# Patient Record
Sex: FEMALE | Race: OTHER | Employment: FULL TIME | URBAN - METROPOLITAN AREA
[De-identification: names, ages, dates, MRNs, and addresses within clinical notes are randomized per-mention and may not be internally consistent; named-entity substitution may affect disease eponyms.]

---

## 2018-01-18 ENCOUNTER — ANESTHESIA EVENT (OUTPATIENT)
Dept: SURGERY | Age: 38
End: 2018-01-18
Payer: COMMERCIAL

## 2018-01-18 RX ORDER — FENTANYL CITRATE 50 UG/ML
100 INJECTION, SOLUTION INTRAMUSCULAR; INTRAVENOUS ONCE
Status: CANCELLED | OUTPATIENT
Start: 2018-01-18 | End: 2018-01-18

## 2018-01-22 ENCOUNTER — ANESTHESIA (OUTPATIENT)
Dept: SURGERY | Age: 38
End: 2018-01-22
Payer: COMMERCIAL

## 2018-01-22 ENCOUNTER — HOSPITAL ENCOUNTER (OUTPATIENT)
Age: 38
Setting detail: OUTPATIENT SURGERY
Discharge: HOME | End: 2018-01-22
Attending: SURGERY | Admitting: SURGERY
Payer: COMMERCIAL

## 2018-01-22 VITALS
BODY MASS INDEX: 29.29 KG/M2 | SYSTOLIC BLOOD PRESSURE: 119 MMHG | DIASTOLIC BLOOD PRESSURE: 66 MMHG | TEMPERATURE: 97.8 F | OXYGEN SATURATION: 97 % | HEART RATE: 57 BPM | WEIGHT: 187 LBS | RESPIRATION RATE: 16 BRPM

## 2018-01-22 DIAGNOSIS — K64.8 HEMORRHOIDS, INTERNAL, WITH BLEEDING: Primary | ICD-10-CM

## 2018-01-22 LAB — HCG UR QL: NEGATIVE

## 2018-01-22 PROCEDURE — 77030018836 HC SOL IRR NACL ICUM -A: Performed by: SURGERY

## 2018-01-22 PROCEDURE — 74011250636 HC RX REV CODE- 250/636

## 2018-01-22 PROCEDURE — 77030011640 HC PAD GRND REM COVD -A: Performed by: SURGERY

## 2018-01-22 PROCEDURE — 77030032490 HC SLV COMPR SCD KNE COVD -B: Performed by: SURGERY

## 2018-01-22 PROCEDURE — 74011000250 HC RX REV CODE- 250: Performed by: SURGERY

## 2018-01-22 PROCEDURE — 74011000258 HC RX REV CODE- 258: Performed by: SURGERY

## 2018-01-22 PROCEDURE — 76010000138 HC OR TIME 0.5 TO 1 HR: Performed by: SURGERY

## 2018-01-22 PROCEDURE — 77030020143 HC AIRWY LARYN INTUB CGAS -A: Performed by: ANESTHESIOLOGY

## 2018-01-22 PROCEDURE — 76210000016 HC OR PH I REC 1 TO 1.5 HR: Performed by: SURGERY

## 2018-01-22 PROCEDURE — 74011250636 HC RX REV CODE- 250/636: Performed by: SURGERY

## 2018-01-22 PROCEDURE — 74011250636 HC RX REV CODE- 250/636: Performed by: ANESTHESIOLOGY

## 2018-01-22 PROCEDURE — 76060000033 HC ANESTHESIA 1 TO 1.5 HR: Performed by: SURGERY

## 2018-01-22 PROCEDURE — 76210000020 HC REC RM PH II FIRST 0.5 HR: Performed by: SURGERY

## 2018-01-22 PROCEDURE — 88304 TISSUE EXAM BY PATHOLOGIST: CPT | Performed by: SURGERY

## 2018-01-22 PROCEDURE — 77030034154 HC SHR COAG HARM ACE J&J -F: Performed by: SURGERY

## 2018-01-22 PROCEDURE — 74011250637 HC RX REV CODE- 250/637: Performed by: ANESTHESIOLOGY

## 2018-01-22 PROCEDURE — 74011000250 HC RX REV CODE- 250

## 2018-01-22 PROCEDURE — 81025 URINE PREGNANCY TEST: CPT

## 2018-01-22 RX ORDER — OXYCODONE AND ACETAMINOPHEN 5; 325 MG/1; MG/1
2 TABLET ORAL
Qty: 40 TAB | Refills: 0 | Status: SHIPPED | OUTPATIENT
Start: 2018-01-22 | End: 2018-01-26 | Stop reason: SDUPTHER

## 2018-01-22 RX ORDER — CEFOXITIN 2 G/1
2 INJECTION, POWDER, FOR SOLUTION INTRAVENOUS EVERY 6 HOURS
Status: DISCONTINUED | OUTPATIENT
Start: 2018-01-22 | End: 2018-01-22

## 2018-01-22 RX ORDER — PROPOFOL 10 MG/ML
INJECTION, EMULSION INTRAVENOUS
Status: DISCONTINUED | OUTPATIENT
Start: 2018-01-22 | End: 2018-01-22 | Stop reason: HOSPADM

## 2018-01-22 RX ORDER — LIDOCAINE HYDROCHLORIDE 10 MG/ML
0.1 INJECTION INFILTRATION; PERINEURAL AS NEEDED
Status: DISCONTINUED | OUTPATIENT
Start: 2018-01-22 | End: 2018-01-22 | Stop reason: HOSPADM

## 2018-01-22 RX ORDER — MIDAZOLAM HYDROCHLORIDE 1 MG/ML
2 INJECTION, SOLUTION INTRAMUSCULAR; INTRAVENOUS
Status: DISCONTINUED | OUTPATIENT
Start: 2018-01-22 | End: 2018-01-22 | Stop reason: HOSPADM

## 2018-01-22 RX ORDER — HYDROMORPHONE HYDROCHLORIDE 2 MG/ML
0.5 INJECTION, SOLUTION INTRAMUSCULAR; INTRAVENOUS; SUBCUTANEOUS
Status: DISCONTINUED | OUTPATIENT
Start: 2018-01-22 | End: 2018-01-22 | Stop reason: HOSPADM

## 2018-01-22 RX ORDER — CEFOXITIN 2 G/1
2 INJECTION, POWDER, FOR SOLUTION INTRAVENOUS ONCE
Status: DISCONTINUED | OUTPATIENT
Start: 2018-01-22 | End: 2018-01-22 | Stop reason: SDUPTHER

## 2018-01-22 RX ORDER — DIBUCAINE 1 %
OINTMENT (GRAM) TOPICAL 3 TIMES DAILY
Qty: 30 G | Refills: 3 | Status: SHIPPED | OUTPATIENT
Start: 2018-01-22 | End: 2018-03-05

## 2018-01-22 RX ORDER — HYDROCODONE BITARTRATE AND ACETAMINOPHEN 5; 325 MG/1; MG/1
2 TABLET ORAL AS NEEDED
Status: DISCONTINUED | OUTPATIENT
Start: 2018-01-22 | End: 2018-01-22 | Stop reason: HOSPADM

## 2018-01-22 RX ORDER — DEXAMETHASONE SODIUM PHOSPHATE 4 MG/ML
INJECTION, SOLUTION INTRA-ARTICULAR; INTRALESIONAL; INTRAMUSCULAR; INTRAVENOUS; SOFT TISSUE AS NEEDED
Status: DISCONTINUED | OUTPATIENT
Start: 2018-01-22 | End: 2018-01-22 | Stop reason: HOSPADM

## 2018-01-22 RX ORDER — SODIUM CHLORIDE, SODIUM LACTATE, POTASSIUM CHLORIDE, CALCIUM CHLORIDE 600; 310; 30; 20 MG/100ML; MG/100ML; MG/100ML; MG/100ML
75 INJECTION, SOLUTION INTRAVENOUS CONTINUOUS
Status: DISCONTINUED | OUTPATIENT
Start: 2018-01-22 | End: 2018-01-22 | Stop reason: HOSPADM

## 2018-01-22 RX ORDER — FAMOTIDINE 20 MG/1
20 TABLET, FILM COATED ORAL ONCE
Status: COMPLETED | OUTPATIENT
Start: 2018-01-22 | End: 2018-01-22

## 2018-01-22 RX ORDER — LIDOCAINE HYDROCHLORIDE 20 MG/ML
JELLY TOPICAL AS NEEDED
Status: DISCONTINUED | OUTPATIENT
Start: 2018-01-22 | End: 2018-01-22 | Stop reason: HOSPADM

## 2018-01-22 RX ORDER — MINERAL OIL
15 OIL (ML) ORAL DAILY
Qty: 1 BOTTLE | Refills: 3 | Status: SHIPPED | OUTPATIENT
Start: 2018-01-22 | End: 2018-03-05

## 2018-01-22 RX ORDER — BUPIVACAINE HYDROCHLORIDE 2.5 MG/ML
INJECTION, SOLUTION EPIDURAL; INFILTRATION; INTRACAUDAL AS NEEDED
Status: DISCONTINUED | OUTPATIENT
Start: 2018-01-22 | End: 2018-01-22 | Stop reason: HOSPADM

## 2018-01-22 RX ORDER — FENTANYL CITRATE 50 UG/ML
INJECTION, SOLUTION INTRAMUSCULAR; INTRAVENOUS AS NEEDED
Status: DISCONTINUED | OUTPATIENT
Start: 2018-01-22 | End: 2018-01-22 | Stop reason: HOSPADM

## 2018-01-22 RX ORDER — PROPOFOL 10 MG/ML
INJECTION, EMULSION INTRAVENOUS AS NEEDED
Status: DISCONTINUED | OUTPATIENT
Start: 2018-01-22 | End: 2018-01-22 | Stop reason: HOSPADM

## 2018-01-22 RX ORDER — OXYCODONE HYDROCHLORIDE 5 MG/1
5 TABLET ORAL
Status: DISCONTINUED | OUTPATIENT
Start: 2018-01-22 | End: 2018-01-22 | Stop reason: HOSPADM

## 2018-01-22 RX ORDER — LIDOCAINE HYDROCHLORIDE 20 MG/ML
INJECTION, SOLUTION EPIDURAL; INFILTRATION; INTRACAUDAL; PERINEURAL AS NEEDED
Status: DISCONTINUED | OUTPATIENT
Start: 2018-01-22 | End: 2018-01-22 | Stop reason: HOSPADM

## 2018-01-22 RX ORDER — ADHESIVE BANDAGE
30 BANDAGE TOPICAL DAILY
Qty: 1 BOTTLE | Refills: 3 | Status: SHIPPED | OUTPATIENT
Start: 2018-01-22 | End: 2018-03-05

## 2018-01-22 RX ORDER — MIDAZOLAM HYDROCHLORIDE 1 MG/ML
5 INJECTION, SOLUTION INTRAMUSCULAR; INTRAVENOUS ONCE
Status: DISCONTINUED | OUTPATIENT
Start: 2018-01-22 | End: 2018-01-22 | Stop reason: HOSPADM

## 2018-01-22 RX ORDER — SILVER SULFADIAZINE 10 G/1000G
CREAM TOPICAL AS NEEDED
Status: DISCONTINUED | OUTPATIENT
Start: 2018-01-22 | End: 2018-01-22 | Stop reason: HOSPADM

## 2018-01-22 RX ADMIN — LIDOCAINE HYDROCHLORIDE 100 MG: 20 INJECTION, SOLUTION EPIDURAL; INFILTRATION; INTRACAUDAL; PERINEURAL at 11:19

## 2018-01-22 RX ADMIN — FENTANYL CITRATE 25 MCG: 50 INJECTION, SOLUTION INTRAMUSCULAR; INTRAVENOUS at 11:32

## 2018-01-22 RX ADMIN — FENTANYL CITRATE 25 MCG: 50 INJECTION, SOLUTION INTRAMUSCULAR; INTRAVENOUS at 11:46

## 2018-01-22 RX ADMIN — DEXAMETHASONE SODIUM PHOSPHATE 10 MG: 4 INJECTION, SOLUTION INTRA-ARTICULAR; INTRALESIONAL; INTRAMUSCULAR; INTRAVENOUS; SOFT TISSUE at 11:22

## 2018-01-22 RX ADMIN — FENTANYL CITRATE 50 MCG: 50 INJECTION, SOLUTION INTRAMUSCULAR; INTRAVENOUS at 11:15

## 2018-01-22 RX ADMIN — HYDROMORPHONE HYDROCHLORIDE 0.5 MG: 2 INJECTION, SOLUTION INTRAMUSCULAR; INTRAVENOUS; SUBCUTANEOUS at 12:40

## 2018-01-22 RX ADMIN — SODIUM CHLORIDE, SODIUM LACTATE, POTASSIUM CHLORIDE, AND CALCIUM CHLORIDE: 600; 310; 30; 20 INJECTION, SOLUTION INTRAVENOUS at 12:00

## 2018-01-22 RX ADMIN — PROPOFOL 75 MCG/KG/MIN: 10 INJECTION, EMULSION INTRAVENOUS at 11:26

## 2018-01-22 RX ADMIN — FENTANYL CITRATE 25 MCG: 50 INJECTION, SOLUTION INTRAMUSCULAR; INTRAVENOUS at 11:51

## 2018-01-22 RX ADMIN — HYDROMORPHONE HYDROCHLORIDE 0.5 MG: 2 INJECTION, SOLUTION INTRAMUSCULAR; INTRAVENOUS; SUBCUTANEOUS at 12:47

## 2018-01-22 RX ADMIN — FENTANYL CITRATE 25 MCG: 50 INJECTION, SOLUTION INTRAMUSCULAR; INTRAVENOUS at 11:35

## 2018-01-22 RX ADMIN — FAMOTIDINE 20 MG: 20 TABLET, FILM COATED ORAL at 09:00

## 2018-01-22 RX ADMIN — FENTANYL CITRATE 50 MCG: 50 INJECTION, SOLUTION INTRAMUSCULAR; INTRAVENOUS at 11:55

## 2018-01-22 RX ADMIN — SODIUM CHLORIDE, SODIUM LACTATE, POTASSIUM CHLORIDE, AND CALCIUM CHLORIDE 75 ML/HR: 600; 310; 30; 20 INJECTION, SOLUTION INTRAVENOUS at 09:01

## 2018-01-22 RX ADMIN — CEFOXITIN 2 G: 2 INJECTION, POWDER, FOR SOLUTION INTRAVENOUS at 11:28

## 2018-01-22 RX ADMIN — MIDAZOLAM HYDROCHLORIDE 2 MG: 1 INJECTION, SOLUTION INTRAMUSCULAR; INTRAVENOUS at 11:09

## 2018-01-22 RX ADMIN — PROPOFOL 200 MG: 10 INJECTION, EMULSION INTRAVENOUS at 11:19

## 2018-01-22 NOTE — ANESTHESIA POSTPROCEDURE EVALUATION
Post-Anesthesia Evaluation and Assessment    Patient: Dmitry Rodriguez MRN: [id number]  SSN: [id number]    YOB: 1980  Age: 30 y.o. Sex: female       Cardiovascular Function/Vital Signs  Visit Vitals    /66    Pulse (!) 57    Temp 36.6 °C (97.8 °F)    Resp 16    Wt 84.8 kg (187 lb)    SpO2 97%    BMI 29.29 kg/m2       Patient is status post general anesthesia for Procedure(s): HEMORRHOIDECTOMY WITH HARMONIC SCALPEL. Nausea/Vomiting: None    Postoperative hydration reviewed and adequate. Pain:  Pain Scale 1: Numeric (0 - 10) (01/22/18 1303)  Pain Intensity 1: 2 (01/22/18 1303)   Managed    Neurological Status:   Neuro (WDL): Within Defined Limits (01/22/18 1303)  Neuro  Neurologic State: Drowsy (01/22/18 1240)   At baseline    Mental Status and Level of Consciousness: Arousable    Pulmonary Status:   O2 Device: Room air (01/22/18 1303)   Adequate oxygenation and airway patent    Complications related to anesthesia: None    Post-anesthesia assessment completed.  No concerns    Signed By: Brian Mcqueen MD     January 22, 2018

## 2018-01-22 NOTE — IP AVS SNAPSHOT
[place] 
 
 
 [address] 
[phone] Patient: Leilani Shoulder MRN: [id number] EXY:[id number] About your hospitalization You were admitted on:  January 22, 2018 You last received care in the:  [facility] PACU You were discharged on:  January 22, 2018 Why you were hospitalized Your primary diagnosis was:  Not on File Follow-up Information Follow up With Details Comments Contact Info Jaime Rodríguez MD   [address] [facility] [address] 
[phone] Luis Breath Reji, DO Go on 2/5/2018 MONDAY FEB. 5TH AT 2:20 [address] [facility] [address] [facility] [address] 
[phone] Your Scheduled Appointments Monday February 05, 2018  2:20 PM EST Global Post Op with Luis Breath Reji, DO  
[facility] ([place] [facility]) [address] [place] [id number] [phone] Discharge Orders None A check judit indicates which time of day the medication should be taken. My Medications START taking these medications Instructions Each Dose to Equal  
 Morning Noon Evening Bedtime  
 dibucaine 1 % ointment Commonly known as:  Cozette [place] Your last dose was: Your next dose is:    
   
   
 Apply  to affected area three (3) times daily. magnesium hydroxide 400 mg/5 mL suspension Commonly known as:  MILK OF MAGNESIA Your last dose was: Your next dose is: Take 30 mL by mouth daily. 30 mL  
    
   
   
   
  
 mineral oil liquid Your last dose was: Your next dose is: Take 15 mL by mouth daily. 15 mL  
    
   
   
   
  
 oxyCODONE-acetaminophen 5-325 mg per tablet Commonly known as:  PERCOCET Your last dose was: Your next dose is: Take 2 Tabs by mouth every four (4) hours as needed for Pain. Max Daily Amount: 12 Tabs. 2 Tab CONTINUE taking these medications Instructions Each Dose to Equal  
 Morning Noon Evening Bedtime ADDERALL 20 mg tablet Generic drug:  dextroamphetamine-amphetamine Your last dose was: Your next dose is: Take 20 mg by mouth two (2) times a dayIndications: Attention-Deficit Hyperactivity Disorder. 20 mg Where to Get Your Medications Information on where to get these meds will be given to you by the nurse or doctor. ! Ask your nurse or doctor about these medications  
  dibucaine 1 % ointment  
 magnesium hydroxide 400 mg/5 mL suspension  
 mineral oil liquid  
 oxyCODONE-acetaminophen 5-325 mg per tablet Discharge Instructions 1. May shower. 2. Keep incision clean with regular soap and water. 3. No lifting over 20 lbs. 4. Regular diet as tolerated. 5. May soak in warm/hot bathtub water for pain relief.    
6. No driving on pain medicines. 7. Resume home medicines as usual.  
8.         One tablespoon of Mineral Oil daily to keep bowel movements soft and loose. 9.         Milk of Magnesia 30mL daily to keep bowel movements soft and loose. 10. Nupercainal to be applied topically for burning pain and itching. Clarissa Mendoza, DO 
 
ACTIVITY · As tolerated and as directed by your doctor. · Bathe or shower as directed by your doctor. DIET · Clear liquids until no nausea or vomiting; then light diet for the first day. · Advance to regular diet on second day, unless your doctor orders otherwise. · If nausea and vomiting continues, call your doctor. PAIN 
· Take pain medication as directed by your doctor. · Call your doctor if pain is NOT relieved by medication. · DO NOT take aspirin of blood thinners unless directed by your doctor. CALL YOUR DOCTOR IF  
· Excessive bleeding that does not stop after holding pressure over the area · Temperature of 101 degrees F or above · Excessive redness, swelling or bruising, and/ or green or yellow, smelly discharge from incision AFTER ANESTHESIA · For the first 24 hours: DO NOT Drive, Drink alcoholic beverages, or Make important decisions. · Be aware of dizziness following anesthesia and while taking pain medication. DISCHARGE SUMMARY from Nurse PATIENT INSTRUCTIONS: 
 
After general anesthesia or intravenous sedation, for 24 hours or while taking prescription Narcotics: · Limit your activities · Do not drive and operate hazardous machinery · Do not make important personal or business decisions · Do  not drink alcoholic beverages · If you have not urinated within 8 hours after discharge, please contact your surgeon on call. *  Please give a list of your current medications to your Primary Care Provider. *  Please update this list whenever your medications are discontinued, doses are 
    changed, or new medications (including over-the-counter products) are added. *  Please carry medication information at all times in case of emergency situations. These are general instructions for a healthy lifestyle: No smoking/ No tobacco products/ Avoid exposure to second hand smoke Surgeon General's Warning:  Quitting smoking now greatly reduces serious risk to your health. Obesity, smoking, and sedentary lifestyle greatly increases your risk for illness A healthy diet, regular physical exercise & weight monitoring are important for maintaining a healthy lifestyle You may be retaining fluid if you have a history of heart failure or if you experience any of the following symptoms:  Weight gain of 3 pounds or more overnight or 5 pounds in a week, increased swelling in our hands or feet or shortness of breath while lying flat in bed.   Please call your doctor as soon as you notice any of these symptoms; do not wait until your next office visit. Recognize signs and symptoms of STROKE: 
 
F-face looks uneven A-arms unable to move or move unevenly S-speech slurred or non-existent T-time-call 911 as soon as signs and symptoms begin-DO NOT go Back to bed or wait to see if you get better-TIME IS BRAIN. Rise Ernie Reji, DO Acknowledge New  
   
 
 
 
  
  
  
Introducing [facility]! [employer] introduces [employer] patient portal. Now you can access parts of your medical record, email your doctor's office, and request medication refills online. 1. In your internet browser, go to [email or web address] 2. Click on the First Time User? Click Here link in the Sign In box. You will see the New Member Sign Up page. 3. Enter your [employer] Access Code exactly as it appears below. You will not need to use this code after youve completed the sign-up process. If you do not sign up before the expiration date, you must request a new code. · [employer] Access Code: [email or web address] Expires: 3/18/2018  3:55 PM 
 
4. Enter the last four digits of your Social Security Number (xxxx) and Date of Birth (mm/dd/yyyy) as indicated and click Submit. You will be taken to the next sign-up page. 5. Create a [employer] ID. This will be your [employer] login ID and cannot be changed, so think of one that is secure and easy to remember. 6. Create a [employer] password. You can change your password at any time. 7. Enter your Password Reset Question and Answer. This can be used at a later time if you forget your password. 8. Enter your e-mail address. You will receive e-mail notification when new information is available in [address] 9. Click Sign Up. You can now view and download portions of your medical record. 10. Click the Download Summary menu link to download a portable copy of your medical information. If you have questions, please visit the Frequently Asked Questions section of the MyChart website. Remember, MyChart is NOT to be used for urgent needs. For medical emergencies, dial 911. Now available from your iPhone and Android! Providers Seen During Your Hospitalization Provider Specialty Primary office phone Elsa Zavaleta, [place]  [facility] [phone] Your Primary Care Physician (PCP) Primary Care Physician Office Phone Office Fax Sonia Alcaraz [phone] You are allergic to the following No active allergies Recent Documentation Weight BMI OB Status Smoking Status 84.8 kg 29.29 kg/m2 Having regular periods Never Smoker Emergency Contacts Name Discharge Info Relation Home Work Mobile Az Wang  Spouse [phone] Patient Belongings The following personal items are in your possession at time of discharge: 
  Dental Appliances: None         Home Medications: None   Jewelry: None  Clothing: Footwear, Pants, Shirt    Other Valuables: None Please provide this summary of care documentation to your next provider. Signatures-by signing, you are acknowledging that this After Visit Summary has been reviewed with you and you have received a copy. Patient Signature:  ____________________________________________________________ Date:  ____________________________________________________________  
  
Orestes Favorite Provider Signature:  ____________________________________________________________ Date:  ____________________________________________________________

## 2018-01-22 NOTE — BRIEF OP NOTE
BRIEF OPERATIVE NOTE    Date of Procedure: 1/22/2018   Preoperative Diagnosis: Hemorrhoids, unspecified hemorrhoid type [K64.9]  Postoperative Diagnosis: Hemorrhoids, unspecified hemorrhoid type [K64.9]    Procedure(s): HEMORRHOIDECTOMY WITH HARMONIC SCALPEL  Surgeon(s) and Role:     * Homar Gomez DO - Primary         Assistant Staff:       Surgical Staff:  Circ-1: Jayson Kee RN  Scrub Tech-1: Felecia Siddiqui  Scrub Tech-2: Cuba Mederos  Event Time In   Incision Start 1138   Incision Close 1156     Anesthesia: General   Estimated Blood Loss: minimal  Specimens:   ID Type Source Tests Collected by Time Destination   1 : hemorrhoid 6 o'clock Preservative   Rise Ernie Reji, DO 1/22/2018 1152 Pathology   2 : hemorrhoid 3 o'clock Preservative   Rise Ernie Reji, DO 1/22/2018 1152 Pathology   3 : hemorrhoid 12 o'clock Preservative   Rise Ernie [place], DO 1/22/2018 1152 Pathology      Findings: Hemorrhoids with prolapse at 10, 3 and 12.      Complications: none  Implants: * No implants in log *    Laci Travis Dr

## 2018-01-22 NOTE — INTERVAL H&P NOTE
H&P Update:  May Sadler was seen and examined. History and physical has been reviewed. The patient has been examined.  There have been no significant clinical changes since the completion of the originally dated History and Physical.    Signed By: Elsa Zavaleta DO     January 22, 2018 11:01 AM

## 2018-01-22 NOTE — DISCHARGE INSTRUCTIONS
1. May shower. 2. Keep incision clean with regular soap and water. 3. No lifting over 20 lbs. 4. Regular diet as tolerated. 5. May soak in warm/hot bathtub water for pain relief.     6. No driving on pain medicines. 7. Resume home medicines as usual.   8.         One tablespoon of Mineral Oil daily to keep bowel movements soft and loose. 9.         Milk of Magnesia 30mL daily to keep bowel movements soft and loose. 10. Nupercainal to be applied topically for burning pain and itching. Anita Mendoza, DO    ACTIVITY  · As tolerated and as directed by your doctor. · Bathe or shower as directed by your doctor. DIET  · Clear liquids until no nausea or vomiting; then light diet for the first day. · Advance to regular diet on second day, unless your doctor orders otherwise. · If nausea and vomiting continues, call your doctor. PAIN  · Take pain medication as directed by your doctor. · Call your doctor if pain is NOT relieved by medication. · DO NOT take aspirin of blood thinners unless directed by your doctor. CALL YOUR DOCTOR IF   · Excessive bleeding that does not stop after holding pressure over the area  · Temperature of 101 degrees F or above  · Excessive redness, swelling or bruising, and/ or green or yellow, smelly discharge from incision    AFTER ANESTHESIA   · For the first 24 hours: DO NOT Drive, Drink alcoholic beverages, or Make important decisions. · Be aware of dizziness following anesthesia and while taking pain medication.             DISCHARGE SUMMARY from Nurse    PATIENT INSTRUCTIONS:    After general anesthesia or intravenous sedation, for 24 hours or while taking prescription Narcotics:  · Limit your activities  · Do not drive and operate hazardous machinery  · Do not make important personal or business decisions  · Do  not drink alcoholic beverages  · If you have not urinated within 8 hours after discharge, please contact your surgeon on call.    *  Please give a list of your current medications to your Primary Care Provider. *  Please update this list whenever your medications are discontinued, doses are      changed, or new medications (including over-the-counter products) are added. *  Please carry medication information at all times in case of emergency situations. These are general instructions for a healthy lifestyle:    No smoking/ No tobacco products/ Avoid exposure to second hand smoke    Surgeon General's Warning:  Quitting smoking now greatly reduces serious risk to your health. Obesity, smoking, and sedentary lifestyle greatly increases your risk for illness    A healthy diet, regular physical exercise & weight monitoring are important for maintaining a healthy lifestyle    You may be retaining fluid if you have a history of heart failure or if you experience any of the following symptoms:  Weight gain of 3 pounds or more overnight or 5 pounds in a week, increased swelling in our hands or feet or shortness of breath while lying flat in bed. Please call your doctor as soon as you notice any of these symptoms; do not wait until your next office visit. Recognize signs and symptoms of STROKE:    F-face looks uneven    A-arms unable to move or move unevenly    S-speech slurred or non-existent    T-time-call 911 as soon as signs and symptoms begin-DO NOT go       Back to bed or wait to see if you get better-TIME IS BRAIN.         Aldair Mendoza, DO Acknowledge New

## 2018-01-22 NOTE — H&P (VIEW-ONLY)
Norm Quiet, DO  [address] [facility] [place]  Phone [phone]   Fax [phone]      Date of visit: 2018     Primary/Requesting provider: Jeremias Contreras MD    Chief Complaint   Patient presents with    Hemorrhoids     external              Name: Rory Matias      MRN: [id number]       : 1980       Age: 30 y.o. Sex: female        PCP: Jeremias Contreras MD       CC:    Chief Complaint   Patient presents with    Hemorrhoids     external        HPI:     Rory Matias is a 30 y.o. female who presents for evaluation of hemorrhoids with bleeding and prolapse. This is a 22-year-old female healthy complaining of hemorrhoids beginning with her first pregnancy approximately 2-3 years ago. She states that at that time she developed severe hemorrhoids which resolved until her latest pregnancy which was in September. At that time her hemorrhoids became large thrombosed and painful. She has had episodes of bleeding. She is having trouble with hygiene and reducing hemorrhoids after bowel movements. She denies any signs or symptoms of infection. She denies thrombosis. She states that the bleeding becomes worse with having bowel movements. They also become worse when she is having pregnancies. Here today to discuss hemorrhoid ectomy. .     PMH:    History reviewed. No pertinent past medical history. PSH:    Past Surgical History:   Procedure Laterality Date    HX TONSILLECTOMY         MEDS:    No current outpatient prescriptions on file. No current facility-administered medications for this visit. ALLERGIES:      No Known Allergies    SH:    Social History   Substance Use Topics    Smoking status: Never Smoker    Smokeless tobacco: None    Alcohol use No       FH:    History reviewed. No pertinent family history. Review of Systems   Constitutional: Negative. HENT: Negative. Eyes: Negative. Respiratory: Negative. Negative for cough, hemoptysis, sputum production and shortness of breath. Cardiovascular: Negative. Negative for chest pain, palpitations, orthopnea, claudication, leg swelling and PND. Gastrointestinal: Positive for blood in stool and constipation. Negative for abdominal pain, diarrhea, heartburn, nausea and vomiting. Genitourinary: Negative. Musculoskeletal: Negative. Skin:        External hemorrhoids    Neurological: Negative. Negative for headaches. Endo/Heme/Allergies: Negative. Psychiatric/Behavioral: Negative. Negative for depression, hallucinations, substance abuse and suicidal ideas. The patient is not nervous/anxious. Physical Exam:     Visit Vitals    /66    Pulse 82    Ht 5' 8\" (1.727 m)    Wt 184 lb (83.5 kg)    BMI 27.98 kg/m2         Physical Exam   Constitutional: She is oriented to person, place, and time and well-developed, well-nourished, and in no distress. HENT:   Head: Normocephalic and atraumatic. Mouth/Throat: Oropharynx is clear and moist.   Eyes: EOM are normal. Pupils are equal, round, and reactive to light. Neck: Normal range of motion. Neck supple. No tracheal deviation present. No thyromegaly present. Cardiovascular: Normal rate, regular rhythm and normal heart sounds. No murmur heard. Pulmonary/Chest: Effort normal and breath sounds normal. No respiratory distress. She has no wheezes. She has no rales. Abdominal: Soft. Bowel sounds are normal. She exhibits no distension and no mass. There is no tenderness. There is no rebound and no guarding. Musculoskeletal: Normal range of motion. She exhibits no edema. Neurological: She is alert and oriented to person, place, and time. Skin: Skin is warm and dry. No erythema. Psychiatric: Mood and judgment normal.       Assessment/Plan:  Arthea Gaucher is a 30 y.o. female who has signs and symptoms consistent with hemorrhoids with prolapse and bleeding.   I recommended hemorrhoid ectomy with harmonic scalpel. She was given detailed instructions about bowel preparation using Suprep. Today I explained hemorrhoidectomy using harmonic scalpel. I explained the risk and benefits and potential complications. I also explained 2 weeks of severe pain. Patient wishes to be scheduled at our next available date and time for hemorrhoid ectomy with harmonic scalpel. counseling time more than 50% of visit: 50 minutes      ICD-10-CM ICD-9-CM    1. Hemorrhoids with complication N52.2 731.9        I went through the risks of bleeding, infection and anesthesia. Counseling time:counseling time more than 50% of visit: 50 minutes were utilized in office visit today 50% of time was used in a face-to-face discussion explaining hemorrhoids complications of hemorrhoids and details of hemorrhoid surgery.     Signed: Arabella Mendoza DO   1/8/2018  3:17 PM

## 2018-01-23 NOTE — OP NOTES
[facility]  OPERATIVE REPORT    Solomon Bustillo  MR#: [id number]  : 1980  ACCOUNT #: [de-identified]   DATE OF SERVICE: 2018    DATE OF PROCEDURE: 2018     PREOPERATIVE DIAGNOSIS:  Hemorrhoids with prolapse. POSTOPERATIVE DIAGNOSIS:  Hemorrhoids with prolapse. PROCEDURE  PERFORMED:  Hemorrhoidectomy with Harmonic scalpel. ANESTHESIA:  General endotracheal.      SURGEON:  Wily Batres DO    ASSISTANT:  None. COMPLICATIONS:  None. CONDITION:  Stable. COUNTS:  Sponge count, needle count and instrument count correct x3. ESTIMATED BLOOD LOSS:  Minimal    SPECIMENS REMOVED:  Hemorrhoids, 12, 6, and 3 O'clock    DESCRIPTION OF PROCEDURE:  This is a 81-year-old female with hemorrhoids with prolapse and bleeding. She is prepared for hemorrhoidectomy with Harmonic scalpel. Consent was obtained after describing the procedure to the patient, including potential complications to include infection, bleeding and pain. Consent was obtained and placed on the chart. She was administered a bowel prep using Suprep the night prior to exam and then Mefoxin 2 grams IV preoperatively. She was taken to operative suite, placed in a supine position. General anesthesia was initiated without complications. She was then prepped and draped in sterile fashion in lithotomy. Following this, a rectal speculum was used to examine the patient and there was a large bilobed hemorrhoid at the 12 o'clock position. The area was then anesthetized with 0.25% Marcaine with epinephrine and then using a Harmonic scalpel, the hemorrhoid was removed starting at the anal verge, tracing the hemorrhoid back to the internal pillar. This was sent as a 12 o'clock hemorrhoid. There was another large hemorrhoid located at the 3 o'clock position. This hemorrhoid was removed in similar fashion and then one again at the 6 o'clock position.   Each hemorrhoid was sent as 12 o'clock, 3 o'clock and 6 o'clock. At this point, we copiously irrigated with saline until clear, ensured hemostasis using a Harmonic scalpel and spot cauterization. We then placed fibrillar soaked sponge with viscous lidocaine and Silvadene and concluded the case. She tolerated the procedure well without immediate complications. FINDINGS:  A 79-year-old female with hemorrhoids with prolapse and bleeding. Three large hemorrhoids were identified, one at the 12 o'clock position, one at the 6 o'clock position and one at the 3 o'clock position all excised hemorrhoid with Harmonic scalpel. She tolerated the procedure well. DO MARIKA PEÑA / JENIFER  D: 01/22/2018 12:07     T: 01/22/2018 15:30  JOB #: [id number]

## 2018-11-14 ENCOUNTER — HOSPITAL ENCOUNTER (OUTPATIENT)
Dept: MRI IMAGING | Age: 38
Discharge: HOME | End: 2018-11-14
Attending: OTOLARYNGOLOGY
Payer: COMMERCIAL

## 2018-11-14 DIAGNOSIS — R42 RECURRENT VERTIGO: ICD-10-CM

## 2018-11-14 PROCEDURE — 74011250636 HC RX REV CODE- 250/636: Performed by: OTOLARYNGOLOGY

## 2018-11-14 PROCEDURE — A9575 INJ GADOTERATE MEGLUMI 0.1ML: HCPCS | Performed by: OTOLARYNGOLOGY

## 2018-11-14 PROCEDURE — 70553 MRI BRAIN STEM W/O & W/DYE: CPT

## 2018-11-14 RX ORDER — SODIUM CHLORIDE 0.9 % (FLUSH) 0.9 %
10 SYRINGE (ML) INJECTION
Status: COMPLETED | OUTPATIENT
Start: 2018-11-14 | End: 2018-11-14

## 2018-11-14 RX ORDER — GADOTERATE MEGLUMINE 376.9 MG/ML
19 INJECTION INTRAVENOUS
Status: COMPLETED | OUTPATIENT
Start: 2018-11-14 | End: 2018-11-14

## 2018-11-14 RX ADMIN — Medication 10 ML: at 19:48

## 2018-11-14 RX ADMIN — GADOTERATE MEGLUMINE 19 ML: 376.9 INJECTION INTRAVENOUS at 19:48

## (undated) DEVICE — SOLUTION IV 1000ML 0.9% SOD CHL

## (undated) DEVICE — GOWN,REINFORCED,POLY,AURORA,XXLARGE,STR: Brand: MEDLINE

## (undated) DEVICE — LEGGINGS, PAIR, 31X48, STERILE: Brand: MEDLINE

## (undated) DEVICE — MEDI-VAC YANKAUER SUCTION HANDLE W/BULBOUS TIP: Brand: CARDINAL HEALTH

## (undated) DEVICE — NEEDLE HYPO 21GA L1.5IN GRN POLYPR HUB S STL REG BVL STR

## (undated) DEVICE — DRAPE,LITHOTOMY,STERILE: Brand: MEDLINE

## (undated) DEVICE — SURGICAL PROCEDURE PACK BASIC ST FRANCIS

## (undated) DEVICE — DRAPE,UNDERBUTTOCKS,PCH,STERILE: Brand: MEDLINE

## (undated) DEVICE — PAD,ABDOMINAL,5"X9",STERILE,LF,1/PK: Brand: MEDLINE INDUSTRIES, INC.

## (undated) DEVICE — REM POLYHESIVE ADULT PATIENT RETURN ELECTRODE: Brand: VALLEYLAB

## (undated) DEVICE — TRAY PREP DRY W/ PREM GLV 2 APPL 6 SPNG 2 UNDPD 1 OVERWRAP

## (undated) DEVICE — SHEARS ENDOSCP L36CM DIA5MM ULTRASONIC CRV TIP W/ ADV

## (undated) DEVICE — MATERNITY KNIT PANTS,SEAMLESS: Brand: WINGS

## (undated) DEVICE — SHEET, T, LAPAROTOMY, STERILE: Brand: MEDLINE

## (undated) DEVICE — JELLY LUBRICATING 10GM PREFIL SYR LUBE

## (undated) DEVICE — KENDALL SCD EXPRESS SLEEVES, KNEE LENGTH, MEDIUM: Brand: KENDALL SCD